# Patient Record
Sex: MALE | Race: AMERICAN INDIAN OR ALASKA NATIVE | NOT HISPANIC OR LATINO | Employment: UNEMPLOYED | URBAN - METROPOLITAN AREA
[De-identification: names, ages, dates, MRNs, and addresses within clinical notes are randomized per-mention and may not be internally consistent; named-entity substitution may affect disease eponyms.]

---

## 2019-07-09 ENCOUNTER — HOSPITAL ENCOUNTER (EMERGENCY)
Age: 39
Discharge: HOME | End: 2019-07-09
Attending: EMERGENCY MEDICINE

## 2019-07-09 VITALS
DIASTOLIC BLOOD PRESSURE: 75 MMHG | BODY MASS INDEX: 33.77 KG/M2 | HEIGHT: 71 IN | WEIGHT: 241.18 LBS | SYSTOLIC BLOOD PRESSURE: 131 MMHG | RESPIRATION RATE: 18 BRPM | OXYGEN SATURATION: 95 % | HEART RATE: 94 BPM | TEMPERATURE: 98 F

## 2019-07-09 DIAGNOSIS — S32.009A CLOSED FRACTURE OF TRANSVERSE PROCESS OF LUMBAR VERTEBRA, INITIAL ENCOUNTER (CMD): Primary | ICD-10-CM

## 2019-07-09 DIAGNOSIS — S05.12XA CONTUSION OF LEFT ORBITAL TISSUES, INITIAL ENCOUNTER: ICD-10-CM

## 2019-07-09 DIAGNOSIS — S62.309A CLOSED FRACTURE OF METACARPAL BONE, UNSPECIFIED FRACTURE MORPHOLOGY, UNSPECIFIED METACARPAL, UNSPECIFIED PORTION OF METACARPAL, INITIAL ENCOUNTER: ICD-10-CM

## 2019-07-09 PROCEDURE — 99284 EMERGENCY DEPT VISIT MOD MDM: CPT

## 2019-07-09 PROCEDURE — 10002803 HB RX 637: Performed by: EMERGENCY MEDICINE

## 2019-07-09 RX ADMIN — ACETAMINOPHEN AND CODEINE PHOSPHATE 1 TABLET: 300; 30 TABLET ORAL at 20:25

## 2019-07-09 ASSESSMENT — PAIN SCALES - GENERAL
PAINLEVEL_OUTOF10: 10

## 2020-08-24 ENCOUNTER — HOSPITAL ENCOUNTER (EMERGENCY)
Facility: CLINIC | Age: 40
Discharge: HOME | End: 2020-08-25
Attending: EMERGENCY MEDICINE | Admitting: EMERGENCY MEDICINE
Payer: MEDICAID

## 2020-08-24 VITALS
HEIGHT: 71 IN | DIASTOLIC BLOOD PRESSURE: 82 MMHG | SYSTOLIC BLOOD PRESSURE: 120 MMHG | TEMPERATURE: 98.1 F | HEART RATE: 110 BPM | OXYGEN SATURATION: 97 % | RESPIRATION RATE: 18 BRPM | BODY MASS INDEX: 36.4 KG/M2 | WEIGHT: 260 LBS

## 2020-08-24 DIAGNOSIS — F10.920 ALCOHOLIC INTOXICATION WITHOUT COMPLICATION (H): ICD-10-CM

## 2020-08-24 PROCEDURE — 25000132 ZZH RX MED GY IP 250 OP 250 PS 637: Performed by: EMERGENCY MEDICINE

## 2020-08-24 PROCEDURE — 99285 EMERGENCY DEPT VISIT HI MDM: CPT

## 2020-08-24 RX ORDER — TRAZODONE HYDROCHLORIDE 100 MG/1
100 TABLET ORAL AT BEDTIME
Status: DISCONTINUED | OUTPATIENT
Start: 2020-08-24 | End: 2020-08-25 | Stop reason: HOSPADM

## 2020-08-24 RX ADMIN — TRAZODONE HYDROCHLORIDE 100 MG: 100 TABLET ORAL at 23:59

## 2020-08-24 ASSESSMENT — MIFFLIN-ST. JEOR: SCORE: 2121.48

## 2020-08-25 ASSESSMENT — ENCOUNTER SYMPTOMS
AGITATION: 1
NAUSEA: 0
VOMITING: 0

## 2020-08-25 NOTE — ED PROVIDER NOTES
Patient was signed out to me by Dr. Vazquez.  He was observed overnight while he sobered.  He is currently no longer clinically intoxicated.  He is requesting discharge and is no longer holdable.     Mike Hernandez MD  08/25/20 0588

## 2020-08-25 NOTE — ED NOTES
Patient in room, talking loudly but no one is in the room with him. Cooperative with cares. Given trazodone po.

## 2020-08-25 NOTE — ED TRIAGE NOTES
[place]. He got out of treatment 3 days ago for heroin.  He also has been mixing up his prescription drugs

## 2020-08-25 NOTE — DISCHARGE INSTRUCTIONS
Please stop drinking alcohol.  This is harmful to your health.  Return to the ED if you want help with your alcohol use.  Follow up with your PCP in 1 week.      Discharge Instructions  Alcohol Intoxication    You have been seen today with alcohol intoxication. This means that you have enough alcohol in your system to impair your ability to mentally and physically function, perhaps to the extent that you were unable to care for yourself.    Generally, every Emergency Department visit should have a follow-up clinic visit with either a primary or a specialty clinic/provider. Please follow-up as instructed by your emergency provider today.    You may have come to the Emergency Department because of your intoxication, or for another reason, such as because of an injury. No matter what the case is, this visit is a  red flag  regarding alcohol use, and you should consider whether your drinking pattern is a problem for you.     You may be at risk for alcohol-related problems if:    Men: you drink more than 14 drinks per week, or more than 4 drinks per occasion.    Women: you drink more than 7 drinks per week or more than 3 drinks per occasion.    You have black-outs.  You do things you regret while drinking.  You have legal problems because of drinking.  You have job problems because of drinking (you call in sick to work because of drinking).    CAGE Questions  Have you ever felt you should cut down on your drinking?  Have people annoyed you by criticizing your drinking?  Have you ever felt bad or guilty about your drinking?  Have you ever had a drink first thing in the morning to steady your nerves or get rid of a hangover (eye opener)?    If you answer yes to any of the CAGE questions, you may have a problem with alcohol.      Return to the Emergency Department if:  You become shaky or tremble when you try to stop drinking.   You have severe abdominal pain (belly pain).   You have a seizure or pass out.    You vomit  (throw up) blood or have blood in your stool. This may be bright red or it may look like black coffee grounds.  You become lightheaded or faint.      For further help, contact:   Your caregiver.    Alcoholics Anonymous (AA).    [facility]: [phone]  [facility]: [phone]   A drug or alcohol rehabilitation program.    You can get information on alcohol resources and groups by calling the number 211 or [phone] on any phone.     Seek medical care if:  You have persistent vomiting.   You have persistent pain in any part of your body.    You do not feel better after a few days.    If you were given a prescription for medicine here today, be sure to read all of the information (including the package insert) that comes with your prescription.  This will include important information about the medicine, its side effects, and any warnings that you need to know about.  The pharmacist who fills the prescription can provide more information and answer questions you may have about the medicine.  If you have questions or concerns that the pharmacist cannot address, please call or return to the Emergency Department.   Remember that you can always come back to the Emergency Department if you are not able to see your regular doctor in the amount of time listed above, if you get any new symptoms, or if there is anything that worries you.

## 2020-08-25 NOTE — ED NOTES
Bed: ED18  Expected date:   Expected time:   Means of arrival:   Comments:  531  40M ETOH/Hold/Cooperative  4530

## 2020-08-25 NOTE — ED PROVIDER NOTES
"  History     Chief Complaint:  Alcohol intoxication    HPI   Alan Gusman is a 40 year old male who presents with alcohol intoxication. The patient reports getting kicked out of treatment for alcohol abuse and has been drinking Fireball consistently for the past 2 days. The patient reports trying to get home to [place], [place] and got intoxicated in the airport which led to the police coming with EMS who brought him to the ED. 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 He denies homocidal ideation, suicidal ideation, nausea, and emesis. He reports wanting to leave at this time.      Allergies:  No known drug allergies    Medications:    The patient is not currently taking any prescribed medications.    Past Medical History:    The patient does not have any past pertinent medical history.    Past Surgical History:    History reviewed. No pertinent surgical history.    Family History:    History reviewed. No pertinent family history.    Social History:  Smoking status: Current every day smoker  Alcohol use: Yes  Drug use: Yes  Patient presents by himself.  PCP: No primary care provider on file.    Marital Status:       Review of Systems   Reason unable to perform ROS: intoxication.   Gastrointestinal: Negative for nausea and vomiting.   Psychiatric/Behavioral: Positive for agitation. Negative for suicidal ideas.     Physical Exam     Patient Vitals for the past 24 hrs:   BP Temp Temp src Pulse Resp SpO2 Height Weight   08/24/20 2159 120/82 98.1  F (36.7  C) Oral 110 18 97 % 1.803 m (5' 11\") 117.9 kg (260 lb)     Physical Exam  General: Resting on the bed.  Sitting on the edge of the bed.    Head: No obvious trauma to head.  Ears, Nose, Throat:  External ears normal.  Nose normal.    Eyes:  Conjunctivae clear.  Pupils are equal, round, and reactive.   Neck: Normal range of motion.  Neck supple.   CV: Regular rate and rhythm.  No murmurs.      Respiratory: Effort normal and breath " sounds normal.  No wheezing or crackles.   Gastrointestinal: Soft.  No distension. There is no tenderness.  Neuro: Alert. Moving all extremities appropriately.  Normal speech.  Steady gait.  Skin: Skin is warm and dry.  No rash noted.   Psych: Normal mood and affect. Behavior is excitable, but cooperative.  He denies suicidal homicidal ideation.    Emergency Department Course   Interventions:  2359: Desyrel 100 mg PO     Emergency Department Course:  The patient arrived in the emergency department via EMS.  Past medical records, nursing notes, and vitals reviewed.  2157: I performed an exam of the patient and obtained history, as documented above.  2303: I rechecked the patient.    0051: The [facility] to detox.    Impression & Plan     Medical Decision Making:  Alan Gusmna is a 40 year old male who presents for evaluation of alcohol abuse and intoxication.  Vital signs are reassuring.  Broad differentials pursued include not limited to overdose, ingestion, alcohol abuse, drug abuse, trauma, etc.  They are intoxicated here in ED by exam and by history. There are no signs of co-ingestion including acetaminophen, drugs, medications, volatile alcohols.  Patient denies any ingestion or overdose.  He reports taking his medications as prescribed.  He denies any homicidal or suicidal ideation.  He does not appear to have any imminent mental health concerns.  There are no signs of trauma related to alcohol use and no further workup is needed including head CT.  Head to toe exam is reassuring.  He has a steady gait.  I do not see evidence of acute trauma.  He denies any drug abuse at this time.  He reports only alcohol use.  He appears to be clinically intoxicated and unsafe for discharge.  He was on JOSE by the police.  At this point he seems to be requiring additional monitoring and will need a sober reassessment.  Plan for sober reassessment and  likely discharge to home in morning.  Patient is hoping to get on a flight to [place].  Patient is cooperative but does get excited when speaking and speaks loudly with gesturing.  I discussed that I think the patient would benefit from sleeping in the emergency department overnight and having a sober reassessment in the morning.  Once he is clinically sober he will be reasonably safe for discharge.  He voiced understand this plan and felt comfortable this.  Signed out to Dr. Hernandez pending reassessment.    Diagnosis:    ICD-10-CM    1. Alcoholic intoxication without complication (H)  F10.920      Disposition:  Signed out to Dr. Hernandez, pending transfer to detox.    Scribe Disclosure:  Davie MARINO, am serving as a scribe at 10:20 PM on 8/24/2020 to document services personally performed by Corry Vazquez MD based on my observations and the provider's statements to me.      Davie Delgado   8/24/2020     EMERGENCY DEPARTMENT    Scribe Disclosure:  IFredi Chi, am serving as a scribe at 12:47 AM on 8/25/2020 to document services personally performed by Corry Vazquez MD based on my observations and the provider's statements to me.        Corry Vazquez MD  08/25/20 0146

## 2021-02-22 ENCOUNTER — HOSPITAL ENCOUNTER (INPATIENT)
Age: 41
LOS: 4 days | Discharge: HOME | DRG: 751 | End: 2021-02-26
Attending: EMERGENCY MEDICINE | Admitting: PSYCHIATRY & NEUROLOGY

## 2021-02-22 DIAGNOSIS — F41.1 ANXIETY STATE: ICD-10-CM

## 2021-02-22 DIAGNOSIS — R45.851 SUICIDAL IDEATION: Primary | ICD-10-CM

## 2021-02-22 DIAGNOSIS — F15.10 AMPHETAMINE ABUSE (CMD): Chronic | ICD-10-CM

## 2021-02-22 DIAGNOSIS — F33.1 MAJOR DEPRESSIVE DISORDER, RECURRENT EPISODE, MODERATE (CMD): ICD-10-CM

## 2021-02-22 PROBLEM — Z91.51 HISTORY OF ATTEMPTED SUICIDE: Chronic | Status: ACTIVE | Noted: 2021-02-22

## 2021-02-22 PROBLEM — F32.A DEPRESSION: Chronic | Status: ACTIVE | Noted: 2021-02-22

## 2021-02-22 LAB
ALBUMIN SERPL-MCNC: 3.6 G/DL (ref 3.6–5.1)
ALBUMIN/GLOB SERPL: 0.8 {RATIO} (ref 1–2.4)
ALP SERPL-CCNC: 88 UNITS/L (ref 45–117)
ALT SERPL-CCNC: 15 UNITS/L
AMPHETAMINES UR QL SCN>500 NG/ML: POSITIVE
ANION GAP SERPL CALC-SCNC: 16 MMOL/L (ref 10–20)
APPEARANCE UR: ABNORMAL
AST SERPL-CCNC: 14 UNITS/L
BARBITURATES UR QL SCN>200 NG/ML: NEGATIVE
BASOPHILS # BLD: 0 K/MCL (ref 0–0.3)
BASOPHILS NFR BLD: 1 %
BENZODIAZ UR QL SCN>200 NG/ML: NEGATIVE
BILIRUB SERPL-MCNC: 0.4 MG/DL (ref 0.2–1)
BILIRUB UR QL STRIP: NEGATIVE
BUN SERPL-MCNC: 10 MG/DL (ref 6–20)
BUN/CREAT SERPL: 13 (ref 7–25)
BZE UR QL SCN>150 NG/ML: NEGATIVE
CALCIUM SERPL-MCNC: 8.7 MG/DL (ref 8.4–10.2)
CANNABINOIDS UR QL SCN>50 NG/ML: NEGATIVE
CHLORIDE SERPL-SCNC: 102 MMOL/L (ref 98–107)
CO2 SERPL-SCNC: 24 MMOL/L (ref 21–32)
COLOR UR: YELLOW
CREAT SERPL-MCNC: 0.8 MG/DL (ref 0.67–1.17)
DEPRECATED RDW RBC: 41.9 FL (ref 39–50)
EOSINOPHIL # BLD: 0.1 K/MCL (ref 0–0.5)
EOSINOPHIL NFR BLD: 1 %
ERYTHROCYTE [DISTWIDTH] IN BLOOD: 12.1 % (ref 11–15)
ETHANOL SERPL-MCNC: NORMAL MG/DL
FASTING DURATION TIME PATIENT: ABNORMAL H
GFR SERPLBLD BASED ON 1.73 SQ M-ARVRAT: >90 ML/MIN/1.73M2
GLOBULIN SER-MCNC: 4.3 G/DL (ref 2–4)
GLUCOSE SERPL-MCNC: 131 MG/DL (ref 65–99)
GLUCOSE UR STRIP-MCNC: NEGATIVE MG/DL
HCT VFR BLD CALC: 46.9 % (ref 39–51)
HGB BLD-MCNC: 16.1 G/DL (ref 13–17)
HGB UR QL STRIP: NEGATIVE
IMM GRANULOCYTES # BLD AUTO: 0 K/MCL (ref 0–0.2)
IMM GRANULOCYTES # BLD: 0 %
KETONES UR STRIP-MCNC: NEGATIVE MG/DL
LEUKOCYTE ESTERASE UR QL STRIP: NEGATIVE
LYMPHOCYTES # BLD: 1.9 K/MCL (ref 1–4.8)
LYMPHOCYTES NFR BLD: 24 %
MAGNESIUM SERPL-MCNC: 2 MG/DL (ref 1.7–2.4)
MCH RBC QN AUTO: 32.2 PG (ref 26–34)
MCHC RBC AUTO-ENTMCNC: 34.3 G/DL (ref 32–36.5)
MCV RBC AUTO: 93.8 FL (ref 78–100)
MONOCYTES # BLD: 0.5 K/MCL (ref 0.3–0.9)
MONOCYTES NFR BLD: 6 %
NEUTROPHILS # BLD: 5.5 K/MCL (ref 1.8–7.7)
NEUTROPHILS NFR BLD: 68 %
NITRITE UR QL STRIP: NEGATIVE
NRBC BLD MANUAL-RTO: 0 /100 WBC
OPIATES UR QL SCN>300 NG/ML: NEGATIVE
PCP UR QL SCN>25 NG/ML: NEGATIVE
PH UR STRIP: 6 [PH] (ref 5–7)
PLATELET # BLD AUTO: 247 K/MCL (ref 140–450)
POTASSIUM SERPL-SCNC: 3.7 MMOL/L (ref 3.4–5.1)
PROT SERPL-MCNC: 7.9 G/DL (ref 6.4–8.2)
PROT UR STRIP-MCNC: NEGATIVE MG/DL
RAINBOW EXTRA TUBES HOLD SPECIMEN: NORMAL
RAINBOW EXTRA TUBES HOLD SPECIMEN: NORMAL
RBC # BLD: 5 MIL/MCL (ref 4.5–5.9)
SARS-COV-2 RNA RESP QL NAA+PROBE: NOT DETECTED
SERVICE CMNT-IMP: NORMAL
SERVICE CMNT-IMP: NORMAL
SODIUM SERPL-SCNC: 138 MMOL/L (ref 135–145)
SP GR UR STRIP: 1.01 (ref 1–1.03)
UROBILINOGEN UR STRIP-MCNC: 2 MG/DL
WBC # BLD: 8.1 K/MCL (ref 4.2–11)

## 2021-02-22 PROCEDURE — U0003 INFECTIOUS AGENT DETECTION BY NUCLEIC ACID (DNA OR RNA); SEVERE ACUTE RESPIRATORY SYNDROME CORONAVIRUS 2 (SARS-COV-2) (CORONAVIRUS DISEASE [COVID-19]), AMPLIFIED PROBE TECHNIQUE, MAKING USE OF HIGH THROUGHPUT TECHNOLOGIES AS DESCRIBED BY CMS-2020-01-R: HCPCS | Performed by: EMERGENCY MEDICINE

## 2021-02-22 PROCEDURE — 36415 COLL VENOUS BLD VENIPUNCTURE: CPT

## 2021-02-22 PROCEDURE — 81003 URINALYSIS AUTO W/O SCOPE: CPT | Performed by: EMERGENCY MEDICINE

## 2021-02-22 PROCEDURE — 99285 EMERGENCY DEPT VISIT HI MDM: CPT

## 2021-02-22 PROCEDURE — 99223 1ST HOSP IP/OBS HIGH 75: CPT | Performed by: NURSE PRACTITIONER

## 2021-02-22 PROCEDURE — 82077 ASSAY SPEC XCP UR&BREATH IA: CPT | Performed by: EMERGENCY MEDICINE

## 2021-02-22 PROCEDURE — 10004577 HB ROOM CHARGE PSYCH

## 2021-02-22 PROCEDURE — 80307 DRUG TEST PRSMV CHEM ANLYZR: CPT | Performed by: EMERGENCY MEDICINE

## 2021-02-22 PROCEDURE — 85025 COMPLETE CBC W/AUTO DIFF WBC: CPT | Performed by: EMERGENCY MEDICINE

## 2021-02-22 PROCEDURE — 83735 ASSAY OF MAGNESIUM: CPT | Performed by: EMERGENCY MEDICINE

## 2021-02-22 PROCEDURE — 10002803 HB RX 637: Performed by: PSYCHIATRY & NEUROLOGY

## 2021-02-22 PROCEDURE — 80053 COMPREHEN METABOLIC PANEL: CPT | Performed by: EMERGENCY MEDICINE

## 2021-02-22 PROCEDURE — 99285 EMERGENCY DEPT VISIT HI MDM: CPT | Performed by: EMERGENCY MEDICINE

## 2021-02-22 RX ORDER — NICOTINE 21 MG/24HR
1 PATCH, TRANSDERMAL 24 HOURS TRANSDERMAL DAILY PRN
Status: DISCONTINUED | OUTPATIENT
Start: 2021-02-22 | End: 2021-02-26 | Stop reason: HOSPADM

## 2021-02-22 RX ORDER — LANOLIN ALCOHOL/MO/W.PET/CERES
250 CREAM (GRAM) TOPICAL DAILY
Status: DISCONTINUED | OUTPATIENT
Start: 2021-02-23 | End: 2021-02-26 | Stop reason: HOSPADM

## 2021-02-22 RX ORDER — HYDROXYZINE HYDROCHLORIDE 25 MG/1
50 TABLET, FILM COATED ORAL EVERY 4 HOURS PRN
Status: DISCONTINUED | OUTPATIENT
Start: 2021-02-22 | End: 2021-02-26 | Stop reason: HOSPADM

## 2021-02-22 RX ORDER — HALOPERIDOL 5 MG/ML
5 INJECTION INTRAMUSCULAR
Status: DISCONTINUED | OUTPATIENT
Start: 2021-02-22 | End: 2021-02-26 | Stop reason: HOSPADM

## 2021-02-22 RX ORDER — TRAZODONE HYDROCHLORIDE 100 MG/1
100 TABLET ORAL NIGHTLY
Status: DISCONTINUED | OUTPATIENT
Start: 2021-02-22 | End: 2021-02-26 | Stop reason: HOSPADM

## 2021-02-22 RX ORDER — LOPERAMIDE HYDROCHLORIDE 2 MG/1
2 CAPSULE ORAL PRN
Status: DISCONTINUED | OUTPATIENT
Start: 2021-02-22 | End: 2021-02-26 | Stop reason: HOSPADM

## 2021-02-22 RX ORDER — VENLAFAXINE 37.5 MG/1
75 TABLET ORAL 2 TIMES DAILY
Status: DISCONTINUED | OUTPATIENT
Start: 2021-02-22 | End: 2021-02-26 | Stop reason: HOSPADM

## 2021-02-22 RX ORDER — GABAPENTIN 400 MG/1
400 CAPSULE ORAL 3 TIMES DAILY
Status: DISCONTINUED | OUTPATIENT
Start: 2021-02-22 | End: 2021-02-26 | Stop reason: HOSPADM

## 2021-02-22 RX ORDER — ONDANSETRON 4 MG/1
4 TABLET, ORALLY DISINTEGRATING ORAL 2 TIMES DAILY PRN
Status: DISCONTINUED | OUTPATIENT
Start: 2021-02-22 | End: 2021-02-26 | Stop reason: HOSPADM

## 2021-02-22 RX ORDER — B-COMPLEX WITH VITAMIN C
250 TABLET ORAL DAILY
COMMUNITY
End: 2022-10-19

## 2021-02-22 RX ORDER — HALOPERIDOL 5 MG/1
5 TABLET ORAL EVERY 4 HOURS PRN
Status: DISCONTINUED | OUTPATIENT
Start: 2021-02-22 | End: 2021-02-26 | Stop reason: HOSPADM

## 2021-02-22 RX ORDER — AMOXICILLIN 250 MG
2 CAPSULE ORAL 2 TIMES DAILY PRN
Status: DISCONTINUED | OUTPATIENT
Start: 2021-02-22 | End: 2021-02-26 | Stop reason: HOSPADM

## 2021-02-22 RX ORDER — MAGNESIUM HYDROXIDE/ALUMINUM HYDROXICE/SIMETHICONE 120; 1200; 1200 MG/30ML; MG/30ML; MG/30ML
30 SUSPENSION ORAL EVERY 4 HOURS PRN
Status: DISCONTINUED | OUTPATIENT
Start: 2021-02-22 | End: 2021-02-26 | Stop reason: HOSPADM

## 2021-02-22 RX ORDER — TRAZODONE HYDROCHLORIDE 50 MG/1
100 TABLET ORAL NIGHTLY
COMMUNITY
Start: 2020-12-07

## 2021-02-22 RX ORDER — OLANZAPINE 5 MG/1
5 TABLET, ORALLY DISINTEGRATING ORAL EVERY 6 HOURS PRN
Status: DISCONTINUED | OUTPATIENT
Start: 2021-02-22 | End: 2021-02-26 | Stop reason: HOSPADM

## 2021-02-22 RX ORDER — GABAPENTIN 400 MG/1
400 CAPSULE ORAL 3 TIMES DAILY
COMMUNITY
Start: 2020-12-07

## 2021-02-22 RX ORDER — BISACODYL 10 MG
10 SUPPOSITORY, RECTAL RECTAL DAILY PRN
Status: DISCONTINUED | OUTPATIENT
Start: 2021-02-22 | End: 2021-02-26 | Stop reason: HOSPADM

## 2021-02-22 RX ORDER — VENLAFAXINE 75 MG/1
75 TABLET ORAL 2 TIMES DAILY
COMMUNITY
Start: 2020-12-07

## 2021-02-22 RX ORDER — POLYETHYLENE GLYCOL 3350 17 G/17G
17 POWDER, FOR SOLUTION ORAL DAILY PRN
Status: DISCONTINUED | OUTPATIENT
Start: 2021-02-22 | End: 2021-02-26 | Stop reason: HOSPADM

## 2021-02-22 RX ORDER — IBUPROFEN 600 MG/1
600 TABLET ORAL EVERY 6 HOURS PRN
Status: DISCONTINUED | OUTPATIENT
Start: 2021-02-22 | End: 2021-02-26 | Stop reason: HOSPADM

## 2021-02-22 RX ADMIN — VENLAFAXINE 75 MG: 37.5 TABLET ORAL at 22:18

## 2021-02-22 RX ADMIN — TRAZODONE HYDROCHLORIDE 100 MG: 100 TABLET ORAL at 22:18

## 2021-02-22 RX ADMIN — GABAPENTIN 400 MG: 400 CAPSULE ORAL at 16:24

## 2021-02-22 RX ADMIN — GABAPENTIN 400 MG: 400 CAPSULE ORAL at 22:18

## 2021-02-22 ASSESSMENT — COGNITIVE AND FUNCTIONAL STATUS - GENERAL
PERCEPTUAL_MISINTERPRETATIONS_HALLUCINATIONS: CLEAR REALITY BASED PERCEPTIONS
AFFECT: FLAT;LONELY
MEMORY: INTACT
LEVEL_OF_CONSCIOUSNESS_CALCULATED: ALERT
BEHAVIOR: SUICIDAL/SUICIDAL IDEATION;POOR EYE CONTACT
MOOD: FLAT
ORIENTATION: ORIENTED (PERSON/PLACE/TIME)
SPEECH: CLEAR/UNDERSTANDABLE
MOTOR_BEHAVIOR-AGITATION_CALCULATED: CALM AND PURPOSEFUL
ATTENTION_CALCULATED: MAINTAINS ATTENTION
MOTOR_BEHAVIOR-RETARDATION_CALCULATED: CALM AND PURPOSEFUL

## 2021-02-22 ASSESSMENT — LIFESTYLE VARIABLES
HAVE YOU BEEN EATING POORLY BECAUSE OF A DECREASED APPETITE: 0
HAVE YOU EVER BEEN EXPOSED TO OTHER VERY DISTURBING, TRAUMATIC OR ANXIETY PROVOKING EVENTS IN YOUR LIFETIME?: NO
AT WHAT AGE STARTED USING: CHILDHOOD(<18 ONLY)
ALCOHOL_USE_STATUS: UNHEALTHY DRINKING IDENTIFIED. AUDIT C: 3 OR MORE FOR WOMEN AND 4 OR MORE FOR MEN.
ALCOHOL_USE: YES
TOBACCO_USE_STATUS_IN_THE_LAST_30_DAYS: NO TOBACCO USED IN THE LAST 30 DAYS
HOW OFTEN DO YOU HAVE A DRINK CONTAINING ALCOHOL: MONTHLY OR LESS
RECENTLY LOST WEIGHT WITHOUT TRYING: 0
AMOUNT OF CAFFEINE: UNSURE
ADL BEFORE ADMISSION: INDEPENDENT
AUDIT-C TOTAL SCORE: 6
CAFFEINE: YES
ARE YOU BLIND OR DO YOU HAVE SERIOUS DIFFICULTY SEEING, EVEN WHEN WEARING GLASSES: NO
HAVE YOU EVER BEEN VERBALLY, EMOTIONALLY, PHYSICALLY OR SEXUALLY ABUSED, OR ABUSED VIA SOCIAL MEDIA?: YES
ALCOHOL_ROUTE: PO
HOW MANY STANDARD DRINKS CONTAINING ALCOHOL DO YOU HAVE ON A TYPICAL DAY: 10 OR MORE
ADL NEEDS ASSIST: NO
IS PATIENT ABLE TO COMPLETE ASSESSMENT AT THIS TIME: YES
HOW OFTEN DO YOU HAVE 6 OR MORE DRINKS ON ONE OCCASION: LESS THAN MONTHLY
CHRONIC/CANCER PAIN PRESENT: NO
E-CIGARETTE_USE: USED E-CIGARETTES IN THE LAST 30 DAYS
SHORT OF BREATH OR FATIGUE WITH ADLS: NO
ARE YOU DEAF OR DO YOU HAVE SERIOUS DIFFICULTY  HEARING: NO
ALCOHOL_TYPE: BEER
RECENT DECLINE IN ADLS: NO

## 2021-02-22 ASSESSMENT — COLUMBIA-SUICIDE SEVERITY RATING SCALE - C-SSRS
TOTAL  NUMBER OF ABORTED OR SELF INTERRUPTED ATTEMPTS PAST 3 MONTHS: NO
6. HAVE YOU EVER DONE ANYTHING, STARTED TO DO ANYTHING, OR PREPARED TO DO ANYTHING TO END YOUR LIFE?: NO
ATTEMPT LIFETIME: YES
TOTAL  NUMBER OF INTERRUPTED ATTEMPTS LIFETIME: NO
REASONS FOR IDEATION LIFETIME: MOSTLY TO END OR STOP THE PAIN (YOU COULDN'T GO ON LIVING WITH THE PAIN OR HOW YOU WERE FEELING)
REASONS FOR IDEATION PAST MONTH: MOSTLY TO END OR STOP THE PAIN (YOU COULDN'T GO ON LIVING WITH THE PAIN OR HOW YOU WERE FEELING)
TOTAL  NUMBER OF INTERRUPTED ATTEMPTS PAST 3 MONTHS: YES
ATTEMPT PAST THREE MONTHS: YES
6. HAVE YOU EVER DONE ANYTHING, STARTED TO DO ANYTHING, OR PREPARED TO DO ANYTHING TO END YOUR LIFE?: NO
TOTAL  NUMBER OF INTERRUPTED ATTEMPTS PAST 3 MONTHS: 1
TOTAL  NUMBER OF ACTUAL ATTEMPTS LIFETIME: 3
TOTAL  NUMBER OF ACTUAL ATTEMPTS PAST 3 MONTHS: 3
MOST RECENT DATE: 65631
LETHALITY/MEDICAL DAMAGE CODE MOST RECENT ACTUAL ATTEMPT: MODERATE PHYSICAL DAMAGE, MEDICAL ATTENTION NEEDED

## 2021-02-22 ASSESSMENT — PAIN SCALES - GENERAL
PAINLEVEL_OUTOF10: 0

## 2021-02-22 ASSESSMENT — ACTIVITIES OF DAILY LIVING (ADL): ADL_SCORE: 12

## 2021-02-23 PROBLEM — F33.1 MAJOR DEPRESSIVE DISORDER, RECURRENT EPISODE, MODERATE (CMD): Status: ACTIVE | Noted: 2021-02-23

## 2021-02-23 PROBLEM — F41.1 ANXIETY STATE: Status: ACTIVE | Noted: 2021-02-23

## 2021-02-23 PROBLEM — F32.A DEPRESSION: Chronic | Status: RESOLVED | Noted: 2021-02-22 | Resolved: 2021-02-23

## 2021-02-23 PROCEDURE — 10002803 HB RX 637: Performed by: PSYCHIATRY & NEUROLOGY

## 2021-02-23 PROCEDURE — 10002803 HB RX 637: Performed by: NURSE PRACTITIONER

## 2021-02-23 PROCEDURE — 10004577 HB ROOM CHARGE PSYCH

## 2021-02-23 PROCEDURE — 90792 PSYCH DIAG EVAL W/MED SRVCS: CPT | Performed by: PSYCHIATRY & NEUROLOGY

## 2021-02-23 RX ADMIN — VENLAFAXINE 75 MG: 37.5 TABLET ORAL at 09:21

## 2021-02-23 RX ADMIN — TRAZODONE HYDROCHLORIDE 100 MG: 100 TABLET ORAL at 22:27

## 2021-02-23 RX ADMIN — Medication 25 MCG: at 09:21

## 2021-02-23 RX ADMIN — GABAPENTIN 400 MG: 400 CAPSULE ORAL at 09:21

## 2021-02-23 RX ADMIN — Medication 250 MG: at 09:21

## 2021-02-23 RX ADMIN — GABAPENTIN 400 MG: 400 CAPSULE ORAL at 22:27

## 2021-02-23 RX ADMIN — VENLAFAXINE 75 MG: 37.5 TABLET ORAL at 22:27

## 2021-02-23 ASSESSMENT — PAIN SCALES - GENERAL: PAINLEVEL_OUTOF10: 0

## 2021-02-24 PROCEDURE — 10004577 HB ROOM CHARGE PSYCH

## 2021-02-24 PROCEDURE — 10002803 HB RX 637: Performed by: NURSE PRACTITIONER

## 2021-02-24 PROCEDURE — 99232 SBSQ HOSP IP/OBS MODERATE 35: CPT | Performed by: PSYCHIATRY & NEUROLOGY

## 2021-02-24 PROCEDURE — 10002803 HB RX 637: Performed by: PSYCHIATRY & NEUROLOGY

## 2021-02-24 RX ADMIN — GABAPENTIN 400 MG: 400 CAPSULE ORAL at 13:05

## 2021-02-24 RX ADMIN — VENLAFAXINE 75 MG: 37.5 TABLET ORAL at 20:33

## 2021-02-24 RX ADMIN — GABAPENTIN 400 MG: 400 CAPSULE ORAL at 08:19

## 2021-02-24 RX ADMIN — GABAPENTIN 400 MG: 400 CAPSULE ORAL at 20:33

## 2021-02-24 RX ADMIN — VENLAFAXINE 75 MG: 37.5 TABLET ORAL at 08:18

## 2021-02-24 RX ADMIN — Medication 250 MG: at 08:18

## 2021-02-24 RX ADMIN — TRAZODONE HYDROCHLORIDE 100 MG: 100 TABLET ORAL at 20:33

## 2021-02-24 RX ADMIN — Medication 25 MCG: at 08:18

## 2021-02-24 ASSESSMENT — ANXIETY QUESTIONNAIRES
6. BECOMING EASILY ANNOYED OR IRRITABLE: 3 - NEARLY EVERY DAY
2. NOT BEING ABLE TO STOP OR CONTROL WORRYING: 3 - NEARLY EVERY DAY
GAD7 TOTAL SCORE: 21
1. FEELING NERVOUS, ANXIOUS, OR ON EDGE: 3 - NEARLY EVERY DAY
7. FEELING AFRAID AS IF SOMETHING AWFUL MIGHT HAPPEN: 3 - NEARLY EVERY DAY
5. BEING SO RESTLESS THAT IT IS HARD TO SIT STILL: 3 - NEARLY EVERY DAY
3. WORRYING TOO MUCH ABOUT DIFFERENT THINGS: 3 - NEARLY EVERY DAY
4. TROUBLE RELAXING: 3 - NEARLY EVERY DAY

## 2021-02-24 ASSESSMENT — PATIENT HEALTH QUESTIONNAIRE - PHQ9
9. THOUGHTS THAT YOU WOULD BE BETTER OFF DEAD, OR OF HURTING YOURSELF: NEARLY EVERY DAY
7. TROUBLE CONCENTRATING ON THINGS, SUCH AS READING THE NEWSPAPER OR WATCHING TELEVISION: NEARLY EVERY DAY
10. IF YOU CHECKED OFF ANY PROBLEMS, HOW DIFFICULT HAVE THESE PROBLEMS MADE IT FOR YOU TO DO YOUR WORK, TAKE CARE OF THINGS AT HOME, OR GET ALONG WITH OTHER PEOPLE: VERY DIFFICULT
2. FEELING DOWN, DEPRESSED OR HOPELESS: MORE THAN HALF THE DAYS
8. MOVING OR SPEAKING SO SLOWLY THAT OTHER PEOPLE COULD HAVE NOTICED. OR THE OPPOSITE, BEING SO FIGETY OR RESTLESS THAT YOU HAVE BEEN MOVING AROUND A LOT MORE THAN USUAL: NEARLY EVERY DAY
1. LITTLE INTEREST OR PLEASURE IN DOING THINGS: NEARLY EVERY DAY
5. POOR APPETITE OR OVEREATING: NOT AT ALL
4. FEELING TIRED OR HAVING LITTLE ENERGY: SEVERAL DAYS
3. TROUBLE FALLING OR STAYING ASLEEP OR SLEEPING TOO MUCH: MORE THAN HALF THE DAYS
SUM OF ALL RESPONSES TO PHQ QUESTIONS 1-9: 20
6. FEELING BAD ABOUT YOURSELF - OR THAT YOU ARE A FAILURE OR HAVE LET YOURSELF OR YOUR FAMILY DOWN: NEARLY EVERY DAY

## 2021-02-24 ASSESSMENT — PAIN SCALES - GENERAL: PAINLEVEL_OUTOF10: 0

## 2021-02-25 PROCEDURE — 99232 SBSQ HOSP IP/OBS MODERATE 35: CPT | Performed by: PSYCHIATRY & NEUROLOGY

## 2021-02-25 PROCEDURE — 10002803 HB RX 637: Performed by: PSYCHIATRY & NEUROLOGY

## 2021-02-25 PROCEDURE — 10002803 HB RX 637: Performed by: NURSE PRACTITIONER

## 2021-02-25 PROCEDURE — 10004577 HB ROOM CHARGE PSYCH

## 2021-02-25 RX ADMIN — Medication 25 MCG: at 08:56

## 2021-02-25 RX ADMIN — GABAPENTIN 400 MG: 400 CAPSULE ORAL at 21:16

## 2021-02-25 RX ADMIN — TRAZODONE HYDROCHLORIDE 100 MG: 100 TABLET ORAL at 21:16

## 2021-02-25 RX ADMIN — GABAPENTIN 400 MG: 400 CAPSULE ORAL at 14:09

## 2021-02-25 RX ADMIN — VENLAFAXINE 75 MG: 37.5 TABLET ORAL at 08:56

## 2021-02-25 RX ADMIN — Medication 250 MG: at 08:56

## 2021-02-25 RX ADMIN — VENLAFAXINE 75 MG: 37.5 TABLET ORAL at 21:16

## 2021-02-25 RX ADMIN — GABAPENTIN 400 MG: 400 CAPSULE ORAL at 08:56

## 2021-02-25 ASSESSMENT — PAIN SCALES - GENERAL: PAINLEVEL_OUTOF10: 0

## 2021-02-26 VITALS
OXYGEN SATURATION: 96 % | DIASTOLIC BLOOD PRESSURE: 69 MMHG | HEART RATE: 80 BPM | SYSTOLIC BLOOD PRESSURE: 125 MMHG | WEIGHT: 258.38 LBS | HEIGHT: 71 IN | RESPIRATION RATE: 16 BRPM | TEMPERATURE: 97.5 F | BODY MASS INDEX: 36.17 KG/M2

## 2021-02-26 PROCEDURE — 99238 HOSP IP/OBS DSCHRG MGMT 30/<: CPT | Performed by: PSYCHIATRY & NEUROLOGY

## 2021-02-26 PROCEDURE — 10002803 HB RX 637: Performed by: PSYCHIATRY & NEUROLOGY

## 2021-02-26 PROCEDURE — 10002803 HB RX 637: Performed by: NURSE PRACTITIONER

## 2021-02-26 RX ADMIN — Medication 250 MG: at 08:26

## 2021-02-26 RX ADMIN — GABAPENTIN 400 MG: 400 CAPSULE ORAL at 08:26

## 2021-02-26 RX ADMIN — Medication 25 MCG: at 08:26

## 2021-02-26 RX ADMIN — VENLAFAXINE 75 MG: 37.5 TABLET ORAL at 08:26

## 2021-02-26 ASSESSMENT — ANXIETY QUESTIONNAIRES
1. FEELING NERVOUS, ANXIOUS, OR ON EDGE: 0 - NOT AT ALL
2. NOT BEING ABLE TO STOP OR CONTROL WORRYING: 0 - NOT AT ALL
GAD7 TOTAL SCORE: 0
5. BEING SO RESTLESS THAT IT IS HARD TO SIT STILL: 0 - NOT AT ALL
3. WORRYING TOO MUCH ABOUT DIFFERENT THINGS: 0 - NOT AT ALL
6. BECOMING EASILY ANNOYED OR IRRITABLE: 0 - NOT AT ALL
7. FEELING AFRAID AS IF SOMETHING AWFUL MIGHT HAPPEN: 0 - NOT AT ALL
4. TROUBLE RELAXING: 0 - NOT AT ALL

## 2021-02-26 ASSESSMENT — PATIENT HEALTH QUESTIONNAIRE - PHQ9
7. TROUBLE CONCENTRATING ON THINGS, SUCH AS READING THE NEWSPAPER OR WATCHING TELEVISION: NOT AT ALL
SUM OF ALL RESPONSES TO PHQ QUESTIONS 1-9: 0
4. FEELING TIRED OR HAVING LITTLE ENERGY: NOT AT ALL
1. LITTLE INTEREST OR PLEASURE IN DOING THINGS: NOT AT ALL
3. TROUBLE FALLING OR STAYING ASLEEP OR SLEEPING TOO MUCH: NOT AT ALL
8. MOVING OR SPEAKING SO SLOWLY THAT OTHER PEOPLE COULD HAVE NOTICED. OR THE OPPOSITE, BEING SO FIGETY OR RESTLESS THAT YOU HAVE BEEN MOVING AROUND A LOT MORE THAN USUAL: NOT AT ALL
5. POOR APPETITE OR OVEREATING: NOT AT ALL
10. IF YOU CHECKED OFF ANY PROBLEMS, HOW DIFFICULT HAVE THESE PROBLEMS MADE IT FOR YOU TO DO YOUR WORK, TAKE CARE OF THINGS AT HOME, OR GET ALONG WITH OTHER PEOPLE: NOT DIFFICULT AT ALL
9. THOUGHTS THAT YOU WOULD BE BETTER OFF DEAD, OR OF HURTING YOURSELF: NOT AT ALL
6. FEELING BAD ABOUT YOURSELF - OR THAT YOU ARE A FAILURE OR HAVE LET YOURSELF OR YOUR FAMILY DOWN: NOT AT ALL
2. FEELING DOWN, DEPRESSED OR HOPELESS: NOT AT ALL

## 2021-02-26 ASSESSMENT — PAIN SCALES - GENERAL: PAINLEVEL_OUTOF10: 0

## 2021-03-08 ENCOUNTER — OFFICE VISIT (OUTPATIENT)
Dept: OCCUPATIONAL MEDICINE | Age: 41
End: 2021-03-08

## 2021-03-08 DIAGNOSIS — Z00.8 HEALTH EXAMINATION IN POPULATION SURVEY: Primary | ICD-10-CM

## 2021-03-08 PROCEDURE — OH143 RAPID TEST DRUG KIT & COLLECTION 10 PANEL: Performed by: PREVENTIVE MEDICINE

## 2021-09-16 ENCOUNTER — LAB REQUISITION (OUTPATIENT)
Dept: LAB | Age: 41
End: 2021-09-16

## 2021-09-16 DIAGNOSIS — Z13.9 ENCOUNTER FOR SCREENING, UNSPECIFIED: ICD-10-CM

## 2021-09-16 LAB
ALBUMIN SERPL-MCNC: 3.9 G/DL (ref 3.6–5.1)
ALP SERPL-CCNC: 92 UNITS/L (ref 45–117)
ALT SERPL-CCNC: 33 UNITS/L
AST SERPL-CCNC: 18 UNITS/L
BILIRUB CONJ SERPL-MCNC: 0.2 MG/DL (ref 0–0.2)
BILIRUB SERPL-MCNC: 0.8 MG/DL (ref 0.2–1)
BUN SERPL-MCNC: 9 MG/DL (ref 6–20)
CALCIUM SERPL-MCNC: 9.3 MG/DL (ref 8.4–10.2)
CHLORIDE SERPL-SCNC: 104 MMOL/L (ref 98–107)
CHOLEST SERPL-MCNC: 221 MG/DL
CREAT SERPL-MCNC: 0.9 MG/DL (ref 0.67–1.17)
FASTING DURATION TIME PATIENT: 12 HOURS (ref 0–999)
FASTING DURATION TIME PATIENT: 12 HOURS (ref 0–999)
FASTING DURATION TIME PATIENT: NORMAL H
GFR SERPLBLD BASED ON 1.73 SQ M-ARVRAT: >90 ML/MIN
GGT SERPL-CCNC: 23 UNITS/L (ref 15–85)
GLUCOSE SERPL-MCNC: 88 MG/DL (ref 65–99)
PHOSPHATE SERPL-MCNC: 3.9 MG/DL (ref 2.4–4.7)
POTASSIUM SERPL-SCNC: 4.9 MMOL/L (ref 3.4–5.1)
PROT SERPL-MCNC: 7.9 G/DL (ref 6.4–8.2)
SODIUM SERPL-SCNC: 139 MMOL/L (ref 135–145)
TRIGL SERPL-MCNC: 164 MG/DL
URATE SERPL-MCNC: 6.7 MG/DL (ref 3.5–7.2)

## 2021-09-16 PROCEDURE — PSEU8282 POTASSIUM: Performed by: CLINICAL MEDICAL LABORATORY

## 2021-09-16 PROCEDURE — PSEU8239 BLOOD UREA NITROGEN: Performed by: CLINICAL MEDICAL LABORATORY

## 2021-09-16 PROCEDURE — 86803 HEPATITIS C AB TEST: CPT | Performed by: CLINICAL MEDICAL LABORATORY

## 2021-09-16 PROCEDURE — PSEU8246 CHLORIDE: Performed by: CLINICAL MEDICAL LABORATORY

## 2021-09-16 PROCEDURE — 84520 ASSAY OF UREA NITROGEN: CPT | Performed by: CLINICAL MEDICAL LABORATORY

## 2021-09-16 PROCEDURE — PSEU8263 GGT: Performed by: CLINICAL MEDICAL LABORATORY

## 2021-09-16 PROCEDURE — PSEU8279 PHOSPHORUS: Performed by: CLINICAL MEDICAL LABORATORY

## 2021-09-16 PROCEDURE — 84550 ASSAY OF BLOOD/URIC ACID: CPT | Performed by: CLINICAL MEDICAL LABORATORY

## 2021-09-16 PROCEDURE — 84295 ASSAY OF SERUM SODIUM: CPT | Performed by: CLINICAL MEDICAL LABORATORY

## 2021-09-16 PROCEDURE — PSEU8247 CHOLESTEROL: Performed by: CLINICAL MEDICAL LABORATORY

## 2021-09-16 PROCEDURE — 82435 ASSAY OF BLOOD CHLORIDE: CPT | Performed by: CLINICAL MEDICAL LABORATORY

## 2021-09-16 PROCEDURE — PSEU8267 HEPATIC FUNCTION PANEL: Performed by: CLINICAL MEDICAL LABORATORY

## 2021-09-16 PROCEDURE — 82310 ASSAY OF CALCIUM: CPT | Performed by: CLINICAL MEDICAL LABORATORY

## 2021-09-16 PROCEDURE — 82565 ASSAY OF CREATININE: CPT | Performed by: CLINICAL MEDICAL LABORATORY

## 2021-09-16 PROCEDURE — PSEU8528 HEPATITIS C ANTIBODY WITH REFLEX: Performed by: CLINICAL MEDICAL LABORATORY

## 2021-09-16 PROCEDURE — PSEU9047 TRIGLYCERIDE: Performed by: CLINICAL MEDICAL LABORATORY

## 2021-09-16 PROCEDURE — PSEU8253 CREATININE: Performed by: CLINICAL MEDICAL LABORATORY

## 2021-09-16 PROCEDURE — 82947 ASSAY GLUCOSE BLOOD QUANT: CPT | Performed by: CLINICAL MEDICAL LABORATORY

## 2021-09-16 PROCEDURE — PSEU8303 URIC ACID: Performed by: CLINICAL MEDICAL LABORATORY

## 2021-09-16 PROCEDURE — 80076 HEPATIC FUNCTION PANEL: CPT | Performed by: CLINICAL MEDICAL LABORATORY

## 2021-09-16 PROCEDURE — PSEU8103 GLUCOSE LEVEL: Performed by: CLINICAL MEDICAL LABORATORY

## 2021-09-16 PROCEDURE — 84478 ASSAY OF TRIGLYCERIDES: CPT | Performed by: CLINICAL MEDICAL LABORATORY

## 2021-09-16 PROCEDURE — 82465 ASSAY BLD/SERUM CHOLESTEROL: CPT | Performed by: CLINICAL MEDICAL LABORATORY

## 2021-09-16 PROCEDURE — 84100 ASSAY OF PHOSPHORUS: CPT | Performed by: CLINICAL MEDICAL LABORATORY

## 2021-09-16 PROCEDURE — 84132 ASSAY OF SERUM POTASSIUM: CPT | Performed by: CLINICAL MEDICAL LABORATORY

## 2021-09-16 PROCEDURE — PSEU8240 CALCIUM: Performed by: CLINICAL MEDICAL LABORATORY

## 2021-09-16 PROCEDURE — PSEU8294 SODIUM: Performed by: CLINICAL MEDICAL LABORATORY

## 2021-09-16 PROCEDURE — 82977 ASSAY OF GGT: CPT | Performed by: CLINICAL MEDICAL LABORATORY

## 2021-09-17 LAB — HCV AB SER QL: NEGATIVE

## 2022-10-19 ENCOUNTER — OFFICE VISIT (OUTPATIENT)
Dept: FAMILY MEDICINE | Age: 42
End: 2022-10-19

## 2022-10-19 VITALS
WEIGHT: 222.1 LBS | HEIGHT: 71 IN | HEART RATE: 88 BPM | TEMPERATURE: 97.8 F | BODY MASS INDEX: 31.09 KG/M2 | DIASTOLIC BLOOD PRESSURE: 78 MMHG | SYSTOLIC BLOOD PRESSURE: 110 MMHG

## 2022-10-19 DIAGNOSIS — F90.9 ATTENTION DEFICIT HYPERACTIVITY DISORDER (ADHD), UNSPECIFIED ADHD TYPE: Primary | ICD-10-CM

## 2022-10-19 DIAGNOSIS — Z86.59 HISTORY OF MAJOR DEPRESSION: ICD-10-CM

## 2022-10-19 PROCEDURE — 99203 OFFICE O/P NEW LOW 30 MIN: CPT | Performed by: STUDENT IN AN ORGANIZED HEALTH CARE EDUCATION/TRAINING PROGRAM

## 2022-10-19 RX ORDER — DEXTROAMPHETAMINE SACCHARATE, AMPHETAMINE ASPARTATE, DEXTROAMPHETAMINE SULFATE AND AMPHETAMINE SULFATE 7.5; 7.5; 7.5; 7.5 MG/1; MG/1; MG/1; MG/1
30 TABLET ORAL
COMMUNITY
Start: 2022-10-13

## 2022-10-19 ASSESSMENT — PATIENT HEALTH QUESTIONNAIRE - PHQ9
CLINICAL INTERPRETATION OF PHQ2 SCORE: NO FURTHER SCREENING NEEDED
SUM OF ALL RESPONSES TO PHQ9 QUESTIONS 1 AND 2: 0
SUM OF ALL RESPONSES TO PHQ9 QUESTIONS 1 AND 2: 0
2. FEELING DOWN, DEPRESSED OR HOPELESS: NOT AT ALL
1. LITTLE INTEREST OR PLEASURE IN DOING THINGS: NOT AT ALL

## 2022-10-23 ASSESSMENT — ENCOUNTER SYMPTOMS
NUMBNESS: 0
LIGHT-HEADEDNESS: 0
ADENOPATHY: 0
RHINORRHEA: 0
EYE PAIN: 0
VOMITING: 0
SINUS PAIN: 0
SINUS PRESSURE: 0
APPETITE CHANGE: 0
DIARRHEA: 0
SORE THROAT: 0
CHEST TIGHTNESS: 0
PHOTOPHOBIA: 0
EYE DISCHARGE: 0
CONSTIPATION: 0
BACK PAIN: 0
FATIGUE: 0
COUGH: 0
WEAKNESS: 0
CHILLS: 0
DIZZINESS: 0
ACTIVITY CHANGE: 0
ABDOMINAL PAIN: 0
ENDOCRINE NEGATIVE: 1
WHEEZING: 0
CONFUSION: 0
COLOR CHANGE: 0
FEVER: 0
TROUBLE SWALLOWING: 0
EYE ITCHING: 0
HALLUCINATIONS: 0
AGITATION: 0
HEADACHES: 0
NAUSEA: 0
SHORTNESS OF BREATH: 0

## 2022-11-09 ENCOUNTER — TELEPHONE (OUTPATIENT)
Dept: PSYCHIATRY | Age: 42
End: 2022-11-09

## 2023-07-26 DIAGNOSIS — Z79.899 ENCOUNTER FOR LONG-TERM (CURRENT) USE OF OTHER MEDICATIONS: Primary | ICD-10-CM

## 2023-09-28 DIAGNOSIS — Z79.899 OTHER LONG TERM (CURRENT) DRUG THERAPY: Primary | ICD-10-CM

## 2023-09-29 ENCOUNTER — APPOINTMENT (OUTPATIENT)
Dept: LAB | Age: 43
End: 2023-09-29

## 2023-12-21 ENCOUNTER — WALK IN (OUTPATIENT)
Dept: URGENT CARE | Age: 43
End: 2023-12-21

## 2023-12-21 VITALS
DIASTOLIC BLOOD PRESSURE: 88 MMHG | OXYGEN SATURATION: 97 % | SYSTOLIC BLOOD PRESSURE: 130 MMHG | HEART RATE: 119 BPM | TEMPERATURE: 99.9 F

## 2023-12-21 DIAGNOSIS — J02.9 SORE THROAT: ICD-10-CM

## 2023-12-21 DIAGNOSIS — H66.001 NON-RECURRENT ACUTE SUPPURATIVE OTITIS MEDIA OF RIGHT EAR WITHOUT SPONTANEOUS RUPTURE OF TYMPANIC MEMBRANE: Primary | ICD-10-CM

## 2023-12-21 PROCEDURE — 99203 OFFICE O/P NEW LOW 30 MIN: CPT | Performed by: PHYSICIAN ASSISTANT

## 2023-12-21 RX ORDER — IBUPROFEN 200 MG
200 TABLET ORAL EVERY 6 HOURS PRN
Qty: 30 TABLET | Refills: 0 | Status: SHIPPED | OUTPATIENT
Start: 2023-12-21

## 2023-12-21 RX ORDER — CEFDINIR 300 MG/1
300 CAPSULE ORAL 2 TIMES DAILY
Qty: 14 CAPSULE | Refills: 0 | Status: SHIPPED | OUTPATIENT
Start: 2023-12-21 | End: 2023-12-28

## 2023-12-21 RX ORDER — RISPERIDONE 0.25 MG/1
0.25 TABLET ORAL 2 TIMES DAILY
COMMUNITY

## 2024-02-05 ENCOUNTER — APPOINTMENT (OUTPATIENT)
Dept: OCCUPATIONAL MEDICINE | Age: 44
End: 2024-02-05

## 2024-02-07 ENCOUNTER — APPOINTMENT (OUTPATIENT)
Dept: OCCUPATIONAL MEDICINE | Age: 44
End: 2024-02-07

## 2024-02-07 VITALS
HEART RATE: 88 BPM | WEIGHT: 271 LBS | SYSTOLIC BLOOD PRESSURE: 108 MMHG | HEIGHT: 70 IN | BODY MASS INDEX: 38.8 KG/M2 | DIASTOLIC BLOOD PRESSURE: 64 MMHG

## 2024-02-07 DIAGNOSIS — Z02.89 ENCOUNTER FOR OCCUPATIONAL HEALTH EXAMINATION: Primary | ICD-10-CM

## 2024-02-07 DIAGNOSIS — Z02.89 VISIT FOR OCCUPATIONAL HEALTH EXAMINATION: ICD-10-CM

## 2024-02-07 PROCEDURE — OH021 PRE PLACEMENT PHYSICAL EXAM: Performed by: PHYSICIAN ASSISTANT

## 2024-02-07 PROCEDURE — OH199 RAPID 9 PANEL DRUG SCREEN: Performed by: PHYSICIAN ASSISTANT

## 2024-02-07 PROCEDURE — 92552 PURE TONE AUDIOMETRY AIR: CPT | Performed by: PHYSICIAN ASSISTANT

## 2024-02-19 ENCOUNTER — WALK IN (OUTPATIENT)
Dept: OCCUPATIONAL MEDICINE | Age: 44
End: 2024-02-19

## 2024-02-19 VITALS
TEMPERATURE: 98.5 F | BODY MASS INDEX: 38.8 KG/M2 | HEART RATE: 76 BPM | WEIGHT: 271 LBS | HEIGHT: 70 IN | SYSTOLIC BLOOD PRESSURE: 128 MMHG | DIASTOLIC BLOOD PRESSURE: 84 MMHG

## 2024-02-19 DIAGNOSIS — T30.4: Primary | ICD-10-CM

## 2024-02-19 PROCEDURE — 99202 OFFICE O/P NEW SF 15 MIN: CPT | Performed by: PHYSICIAN ASSISTANT

## 2024-02-20 ENCOUNTER — EXTERNAL RECORD (OUTPATIENT)
Dept: OTHER | Age: 44
End: 2024-02-20

## 2024-02-23 ENCOUNTER — CASE MANAGEMENT (OUTPATIENT)
Dept: OCCUPATIONAL MEDICINE | Age: 44
End: 2024-02-23

## 2024-02-23 ENCOUNTER — TELEPHONE (OUTPATIENT)
Dept: OCCUPATIONAL MEDICINE | Age: 44
End: 2024-02-23

## 2024-02-23 ENCOUNTER — APPOINTMENT (OUTPATIENT)
Dept: OCCUPATIONAL MEDICINE | Age: 44
End: 2024-02-23